# Patient Record
Sex: FEMALE | URBAN - METROPOLITAN AREA
[De-identification: names, ages, dates, MRNs, and addresses within clinical notes are randomized per-mention and may not be internally consistent; named-entity substitution may affect disease eponyms.]

---

## 2024-02-20 ENCOUNTER — APPOINTMENT (OUTPATIENT)
Dept: URBAN - METROPOLITAN AREA CLINIC 295 | Age: 79
Setting detail: DERMATOLOGY
End: 2024-02-21

## 2024-02-20 DIAGNOSIS — L71.8 OTHER ROSACEA: ICD-10-CM

## 2024-02-20 PROCEDURE — OTHER PRESCRIPTION: OTHER

## 2024-02-20 PROCEDURE — OTHER MIPS QUALITY: OTHER

## 2024-02-20 PROCEDURE — OTHER TREATMENT REGIMEN: OTHER

## 2024-02-20 PROCEDURE — OTHER COUNSELING: OTHER

## 2024-02-20 PROCEDURE — OTHER ADDITIONAL NOTES: OTHER

## 2024-02-20 PROCEDURE — 99203 OFFICE O/P NEW LOW 30 MIN: CPT

## 2024-02-20 RX ORDER — METRONIDAZOLE 7.5 MG/G
GEL TOPICAL
Qty: 45 | Refills: 3 | Status: ERX | COMMUNITY
Start: 2024-02-20

## 2024-02-20 ASSESSMENT — LOCATION DETAILED DESCRIPTION DERM: LOCATION DETAILED: LEFT MEDIAL MALAR CHEEK

## 2024-02-20 ASSESSMENT — LOCATION SIMPLE DESCRIPTION DERM: LOCATION SIMPLE: LEFT CHEEK

## 2024-02-20 ASSESSMENT — LOCATION ZONE DERM: LOCATION ZONE: FACE

## 2024-02-20 NOTE — PROCEDURE: MIPS QUALITY
Quality 226: Preventive Care And Screening: Tobacco Use: Screening And Cessation Intervention: Patient screened for tobacco use and is an ex/non-smoker
Detail Level: Detailed
Quality 47: Advance Care Plan: Advance Care Planning discussed and documented; advance care plan or surrogate decision maker documented in the medical record.
Quality 110: Preventive Care And Screening: Influenza Immunization: Influenza Immunization Administered during Influenza season
Quality 111:Pneumonia Vaccination Status For Older Adults: Patient received any pneumococcal conjugate or polysaccharide vaccine on or after their 60th birthday and before the end of the measurement period
Quality 431: Preventive Care And Screening: Unhealthy Alcohol Use - Screening: Patient not identified as an unhealthy alcohol user when screened for unhealthy alcohol use using a systematic screening method
Name And Contact Information For Health Care Proxy: Bobo Snow (daughter): [phone]

## 2024-02-20 NOTE — PROCEDURE: ADDITIONAL NOTES
Render Risk Assessment In Note?: no
Detail Level: Simple
Additional Notes: Monitoring a spot on the right cheek. Patient wanted to try topical treatments before committing to a punch biopsy.
Detail Level: Detailed